# Patient Record
Sex: FEMALE | Race: WHITE | NOT HISPANIC OR LATINO | Employment: UNEMPLOYED | URBAN - NONMETROPOLITAN AREA
[De-identification: names, ages, dates, MRNs, and addresses within clinical notes are randomized per-mention and may not be internally consistent; named-entity substitution may affect disease eponyms.]

---

## 2017-05-14 ENCOUNTER — HOSPITAL ENCOUNTER (EMERGENCY)
Facility: HOSPITAL | Age: 37
Discharge: HOME | End: 2017-05-14
Attending: EMERGENCY MEDICINE | Admitting: EMERGENCY MEDICINE

## 2017-05-14 VITALS
RESPIRATION RATE: 16 BRPM | WEIGHT: 113.25 LBS | HEART RATE: 80 BPM | DIASTOLIC BLOOD PRESSURE: 72 MMHG | OXYGEN SATURATION: 100 % | HEIGHT: 64 IN | SYSTOLIC BLOOD PRESSURE: 107 MMHG | BODY MASS INDEX: 19.33 KG/M2 | TEMPERATURE: 98.5 F

## 2017-05-14 DIAGNOSIS — N39.0 ACUTE UTI: Primary | ICD-10-CM

## 2017-05-14 LAB
B-HCG UR QL: NEGATIVE
BACTERIA UR QL AUTO: ABNORMAL /HPF
BILIRUB UR QL STRIP: NEGATIVE
CLARITY UR: ABNORMAL
COLOR UR: ABNORMAL
GLUCOSE UR STRIP-MCNC: NEGATIVE MG/DL
HGB UR QL STRIP.AUTO: ABNORMAL
HYALINE CASTS UR QL AUTO: ABNORMAL /LPF
INTERNAL NEGATIVE CONTROL: NEGATIVE
INTERNAL POSITIVE CONTROL: POSITIVE
KETONES UR QL STRIP: NEGATIVE
LEUKOCYTE ESTERASE UR QL STRIP.AUTO: ABNORMAL
Lab: NORMAL
NITRITE UR QL STRIP: NEGATIVE
PH UR STRIP.AUTO: 6.5 [PH] (ref 5–8)
PROT UR QL STRIP: ABNORMAL
RBC # UR: ABNORMAL /HPF
REF LAB TEST METHOD: ABNORMAL
SP GR UR STRIP: 1.02 (ref 1–1.03)
SQUAMOUS #/AREA URNS HPF: ABNORMAL /HPF
UROBILINOGEN UR QL STRIP: ABNORMAL
WBC UR QL AUTO: ABNORMAL /HPF

## 2017-05-14 PROCEDURE — 87088 URINE BACTERIA CULTURE: CPT | Performed by: EMERGENCY MEDICINE

## 2017-05-14 PROCEDURE — 99283 EMERGENCY DEPT VISIT LOW MDM: CPT

## 2017-05-14 PROCEDURE — 87086 URINE CULTURE/COLONY COUNT: CPT | Performed by: EMERGENCY MEDICINE

## 2017-05-14 PROCEDURE — 81001 URINALYSIS AUTO W/SCOPE: CPT | Performed by: EMERGENCY MEDICINE

## 2017-05-14 PROCEDURE — 87186 SC STD MICRODIL/AGAR DIL: CPT | Performed by: EMERGENCY MEDICINE

## 2017-05-14 RX ORDER — KETOROLAC TROMETHAMINE 10 MG/1
10 TABLET, FILM COATED ORAL ONCE
Status: COMPLETED | OUTPATIENT
Start: 2017-05-14 | End: 2017-05-14

## 2017-05-14 RX ORDER — IBUPROFEN 800 MG/1
800 TABLET ORAL EVERY 8 HOURS PRN
Qty: 30 TABLET | Refills: 0 | Status: SHIPPED | OUTPATIENT
Start: 2017-05-14

## 2017-05-14 RX ORDER — SULFAMETHOXAZOLE AND TRIMETHOPRIM 800; 160 MG/1; MG/1
1 TABLET ORAL 2 TIMES DAILY
Qty: 14 TABLET | Refills: 0 | Status: SHIPPED | OUTPATIENT
Start: 2017-05-14

## 2017-05-14 RX ORDER — LEVOFLOXACIN 500 MG/1
500 TABLET, FILM COATED ORAL ONCE
Status: COMPLETED | OUTPATIENT
Start: 2017-05-14 | End: 2017-05-14

## 2017-05-14 RX ADMIN — LEVOFLOXACIN 500 MG: 500 TABLET, FILM COATED ORAL at 20:40

## 2017-05-14 RX ADMIN — KETOROLAC TROMETHAMINE 10 MG: 10 TABLET, FILM COATED ORAL at 20:40

## 2017-05-16 LAB — BACTERIA SPEC AEROBE CULT: ABNORMAL

## 2017-05-17 ENCOUNTER — TELEPHONE (OUTPATIENT)
Dept: EMERGENCY DEPT | Facility: HOSPITAL | Age: 37
End: 2017-05-17

## 2024-05-23 ENCOUNTER — APPOINTMENT (OUTPATIENT)
Dept: CT IMAGING | Facility: HOSPITAL | Age: 44
End: 2024-05-23
Payer: COMMERCIAL

## 2024-05-23 ENCOUNTER — HOSPITAL ENCOUNTER (EMERGENCY)
Facility: HOSPITAL | Age: 44
Discharge: HOME | End: 2024-05-23
Payer: COMMERCIAL

## 2024-05-23 VITALS
TEMPERATURE: 97.7 F | DIASTOLIC BLOOD PRESSURE: 68 MMHG | HEART RATE: 88 BPM | OXYGEN SATURATION: 99 % | WEIGHT: 110 LBS | RESPIRATION RATE: 15 BRPM | HEIGHT: 64 IN | BODY MASS INDEX: 18.78 KG/M2 | SYSTOLIC BLOOD PRESSURE: 110 MMHG

## 2024-05-23 DIAGNOSIS — M51.36 DDD (DEGENERATIVE DISC DISEASE), LUMBAR: ICD-10-CM

## 2024-05-23 DIAGNOSIS — S39.012A STRAIN OF LUMBAR REGION, INITIAL ENCOUNTER: Primary | ICD-10-CM

## 2024-05-23 LAB
B-HCG UR QL: NEGATIVE
BACTERIA UR QL AUTO: ABNORMAL /HPF
BILIRUB UR QL STRIP: NEGATIVE
CLARITY UR: CLEAR
COLOR UR: YELLOW
EXPIRATION DATE: NORMAL
GLUCOSE UR STRIP-MCNC: NEGATIVE MG/DL
HGB UR QL STRIP.AUTO: NEGATIVE
HYALINE CASTS UR QL AUTO: ABNORMAL /LPF
INTERNAL NEGATIVE CONTROL: NEGATIVE
INTERNAL POSITIVE CONTROL: POSITIVE
KETONES UR QL STRIP: NEGATIVE
LEUKOCYTE ESTERASE UR QL STRIP.AUTO: ABNORMAL
Lab: NORMAL
NITRITE UR QL STRIP: NEGATIVE
PH UR STRIP.AUTO: 7 [PH] (ref 5–8)
PROT UR QL STRIP: NEGATIVE
RBC # UR STRIP: ABNORMAL /HPF
REF LAB TEST METHOD: ABNORMAL
SP GR UR STRIP: 1.02 (ref 1–1.03)
SQUAMOUS #/AREA URNS HPF: ABNORMAL /HPF
UROBILINOGEN UR QL STRIP: ABNORMAL
WBC # UR STRIP: ABNORMAL /HPF

## 2024-05-23 PROCEDURE — 72131 CT LUMBAR SPINE W/O DYE: CPT

## 2024-05-23 PROCEDURE — 99284 EMERGENCY DEPT VISIT MOD MDM: CPT

## 2024-05-23 PROCEDURE — 63710000001 ONDANSETRON ODT 4 MG TABLET DISPERSIBLE: Performed by: NURSE PRACTITIONER

## 2024-05-23 PROCEDURE — 81001 URINALYSIS AUTO W/SCOPE: CPT | Performed by: NURSE PRACTITIONER

## 2024-05-23 PROCEDURE — 81025 URINE PREGNANCY TEST: CPT | Performed by: NURSE PRACTITIONER

## 2024-05-23 PROCEDURE — 63710000001 PREDNISONE PER 1 MG: Performed by: NURSE PRACTITIONER

## 2024-05-23 RX ORDER — ONDANSETRON 4 MG/1
4 TABLET, ORALLY DISINTEGRATING ORAL ONCE
Status: COMPLETED | OUTPATIENT
Start: 2024-05-23 | End: 2024-05-23

## 2024-05-23 RX ORDER — PREDNISONE 20 MG/1
20 TABLET ORAL ONCE
Status: COMPLETED | OUTPATIENT
Start: 2024-05-23 | End: 2024-05-23

## 2024-05-23 RX ORDER — HYDROCODONE BITARTRATE AND ACETAMINOPHEN 5; 325 MG/1; MG/1
1 TABLET ORAL EVERY 6 HOURS PRN
Qty: 15 TABLET | Refills: 0 | Status: SHIPPED | OUTPATIENT
Start: 2024-05-23

## 2024-05-23 RX ORDER — ONDANSETRON 4 MG/1
4 TABLET, ORALLY DISINTEGRATING ORAL EVERY 6 HOURS PRN
Qty: 10 TABLET | Refills: 0 | Status: SHIPPED | OUTPATIENT
Start: 2024-05-23 | End: 2024-05-30

## 2024-05-23 RX ORDER — CYCLOBENZAPRINE HCL 10 MG
10 TABLET ORAL 3 TIMES DAILY PRN
Qty: 20 TABLET | Refills: 0 | Status: SHIPPED | OUTPATIENT
Start: 2024-05-23 | End: 2024-05-30

## 2024-05-23 RX ORDER — CYCLOBENZAPRINE HCL 10 MG
10 TABLET ORAL ONCE
Status: COMPLETED | OUTPATIENT
Start: 2024-05-23 | End: 2024-05-23

## 2024-05-23 RX ORDER — METHYLPREDNISOLONE 4 MG/1
TABLET ORAL
Qty: 21 EACH | Refills: 0 | Status: SHIPPED | OUTPATIENT
Start: 2024-05-23 | End: 2024-05-30

## 2024-05-23 RX ORDER — ORPHENADRINE CITRATE 30 MG/ML
60 INJECTION INTRAMUSCULAR; INTRAVENOUS ONCE
Status: DISCONTINUED | OUTPATIENT
Start: 2024-05-23 | End: 2024-05-23

## 2024-05-23 RX ORDER — DEXAMETHASONE SODIUM PHOSPHATE 10 MG/ML
8 INJECTION INTRAMUSCULAR; INTRAVENOUS ONCE
Status: DISCONTINUED | OUTPATIENT
Start: 2024-05-23 | End: 2024-05-23

## 2024-05-23 RX ORDER — OXYCODONE AND ACETAMINOPHEN 7.5; 325 MG/1; MG/1
1 TABLET ORAL ONCE
Status: COMPLETED | OUTPATIENT
Start: 2024-05-23 | End: 2024-05-23

## 2024-05-23 RX ADMIN — OXYCODONE HYDROCHLORIDE AND ACETAMINOPHEN 1 TABLET: 7.5; 325 TABLET ORAL at 16:23

## 2024-05-23 RX ADMIN — ONDANSETRON 4 MG: 4 TABLET, ORALLY DISINTEGRATING ORAL at 16:30

## 2024-05-23 RX ADMIN — PREDNISONE 20 MG: 20 TABLET ORAL at 16:23

## 2024-05-23 RX ADMIN — CYCLOBENZAPRINE HYDROCHLORIDE 10 MG: 10 TABLET, FILM COATED ORAL at 16:23

## 2024-05-23 NOTE — DISCHARGE INSTRUCTIONS
Return to ER if symptoms worsen   [place] to area  Follow up with one of the [facility] physician groups below to setup primary care. If you have trouble making an appointment, please call the [facility] Nurse Line at [phone]    [facility] - [place]  [address] [place] [address]  [id number] [phone]    [facility] - [place] [address], [address], [place], [place] [id number] [phone]    [facility] - [place] [address], [address], [place] [address] [phone]     [facility] [place] - [place]  [address], [address], [place] [address] [phone]    [facility] - [address], [address] [phone]    [facility] - [address] [address] [phone]    [facility] - [place]  [address] [place], [place], [address] [phone]    [facility] - [place]  [address], [address]  [phone]    [facility] - [address] [address] [phone]    [facility] - [place]  [address] [place] [address], [address], [place], [place], [address] [phone]        PEDIATRIC:    [facility] - [place] [address], [address], [place] [address] [phone]

## 2024-05-30 ENCOUNTER — OFFICE VISIT (OUTPATIENT)
Dept: INTERNAL MEDICINE | Facility: CLINIC | Age: 44
End: 2024-05-30
Payer: COMMERCIAL

## 2024-05-30 VITALS
TEMPERATURE: 98.3 F | WEIGHT: 115 LBS | OXYGEN SATURATION: 98 % | HEIGHT: 64 IN | HEART RATE: 72 BPM | BODY MASS INDEX: 19.63 KG/M2 | DIASTOLIC BLOOD PRESSURE: 60 MMHG | SYSTOLIC BLOOD PRESSURE: 104 MMHG

## 2024-05-30 DIAGNOSIS — M47.816 SPONDYLOSIS OF LUMBAR REGION WITHOUT MYELOPATHY OR RADICULOPATHY: ICD-10-CM

## 2024-05-30 DIAGNOSIS — F41.9 ANXIETY AND DEPRESSION: ICD-10-CM

## 2024-05-30 DIAGNOSIS — F32.A ANXIETY AND DEPRESSION: ICD-10-CM

## 2024-05-30 DIAGNOSIS — S39.012A STRAIN OF LUMBAR REGION, INITIAL ENCOUNTER: Primary | ICD-10-CM

## 2024-05-30 PROCEDURE — 99204 OFFICE O/P NEW MOD 45 MIN: CPT | Performed by: INTERNAL MEDICINE

## 2024-05-30 NOTE — PROGRESS NOTES
"anxi      Chief Complaint  Back Pain (Pt seen in er on 5/23/24-low back pain-  pain is better), Establish Care, and Depression (Discuss medication )    Subjective        Nelia Levin presents to [facility]    HPI    Patient here for the above problems.  See Assessment and Plan for further HPI components.      Review of Systems    Objective   Vital Signs:  /60 (BP Location: Left arm, Patient Position: Sitting, Cuff Size: Adult)   Pulse 72   Temp 98.3 °F (36.8 °C) (Temporal)   Ht 162.6 cm (64\")   Wt 52.2 kg (115 lb)   SpO2 98%   BMI 19.74 kg/m²   Estimated body mass index is 19.74 kg/m² as calculated from the following:    Height as of this encounter: 162.6 cm (64\").    Weight as of this encounter: 52.2 kg (115 lb).      Physical Exam  Vitals and nursing note reviewed.   Constitutional:       Appearance: She is not ill-appearing.   Eyes:      General: No scleral icterus.     Conjunctiva/sclera: Conjunctivae normal.   Cardiovascular:      Rate and Rhythm: Normal rate and regular rhythm.   Pulmonary:      Effort: Pulmonary effort is normal. No respiratory distress.   Musculoskeletal:      Thoracic back: No spasms or tenderness. Normal range of motion.      Comments: Normal gait  Normal proximal and distal leg strength    Skin:     General: Skin is warm.      Coloration: Skin is not pale.   Neurological:      General: No focal deficit present.      Mental Status: She is alert and oriented to person, place, and time.   Psychiatric:         Mood and Affect: Mood normal.         Behavior: Behavior normal.                       Assessment and Plan   Diagnoses and all orders for this visit:    1. Strain of lumbar region, initial encounter (Primary)  -     Ambulatory Referral to Physical Therapy    2. Anxiety and depression    3. Spondylosis of lumbar region without myelopathy or radiculopathy    Other orders  -     sertraline (Zoloft) 50 MG tablet; Take 1 tablet by mouth Daily.  " Dispense: 90 tablet; Refill: 2      Patient presents today to establish care.  Patient has not had a primary care doctor in many years as overall healthy.  Recently had an ER visit on 5/23 with back pain.  The patient does not recall any injury or abnormal movements.  The patient received pain medication, muscle relaxant, and nausea medicine in the ER.  Patient feels better, but still not completely back to normal.  Consider physical therapy.  Ordered provided to patient.  She does not feel as though she needs any further medication at present.    Patient reports that she has had issues with depression and anxiety over the years.  This has been recurrent since she was young.  She has been on medications previously but does not recall what medication she was on.  Does not have SI/HI. Patient is interested in trying medication at present.  We discussed PRN such as buspirone or a long-term medication.  We ultimately decided to try zoloft.  Recommend 25 mg x 6 days and then go to the full dose of 50 mg.  Recommend we follow-up in 6-8 weeks to assess.      Patient declines labs for annual visit at next visit.  Patient has not had mammogram.  Patient has not had pap smear in many years.  We can have one of the APRNs perform this or [facility].  Previously followed with Dr. Hand.     Result Review :  The following data was reviewed by: Maximiliano Carmona MD on 05/30/2024:   Latest Reference Range & Units 05/23/24 16:10 05/23/24 16:12   Color, UA Yellow, Straw  Yellow    Appearance, UA Clear  Clear    Specific Gravity, UA 1.005 - 1.030  1.017    pH, UA 5.0 - 8.0  7.0    Glucose Negative  Negative    Ketones, UA Negative  Negative    Blood, UA Negative  Negative    Nitrite, UA Negative  Negative    Leukocytes, UA Negative  Trace !    Protein, UA Negative  Negative    Bilirubin, UA Negative  Negative    Urobilinogen, UA 0.2 - 1.0 E.U./dL  0.2 E.U./dL    RBC, UA None Seen, 0-2 /HPF 0-2    WBC, UA None Seen,  0-2 /HPF 3-5 !    Bacteria, UA None Seen /HPF 1+ !    Squamous Epithelial Cells, UA None Seen, 0-2 /HPF 3-6 !    Hyaline Casts, UA None Seen /LPF 0-2    Methodology:  Automated Microscopy    HCG, Urine QL Negative   Negative   Expiration Date   \01/28/2025\   Lot Number   \[id number]\   !: Data is abnormal    CT Lumbar Spine Without Contrast (05/23/2024 15:44)        BMI is within normal parameters. No other follow-up for BMI required.      BMI is within normal parameters. No other follow-up for BMI required.            Follow Up   Return in about 6 weeks (around 7/11/2024), or if symptoms worsen or fail to improve, for Med Check.  Patient was given instructions and counseling regarding her condition or for health maintenance advice. Please see specific information pulled into the AVS if appropriate.       MENDY Carmona MD, FACP, FHM      Electronically signed by Maximiliano Carmona MD, 05/30/24, 4:05 PM CDT.

## 2024-06-03 PROBLEM — S39.012A STRAIN OF LUMBAR REGION: Status: ACTIVE | Noted: 2024-06-03

## 2024-06-03 PROBLEM — M47.816 SPONDYLOSIS OF LUMBAR REGION WITHOUT MYELOPATHY OR RADICULOPATHY: Status: ACTIVE | Noted: 2024-06-03

## 2024-06-03 PROBLEM — F41.9 ANXIETY AND DEPRESSION: Status: ACTIVE | Noted: 2024-06-03

## 2024-06-03 PROBLEM — F32.A ANXIETY AND DEPRESSION: Status: ACTIVE | Noted: 2024-06-03

## 2024-06-10 ENCOUNTER — TELEPHONE (OUTPATIENT)
Dept: INTERNAL MEDICINE | Facility: CLINIC | Age: 44
End: 2024-06-10

## 2024-06-10 RX ORDER — VENLAFAXINE HYDROCHLORIDE 37.5 MG/1
37.5 CAPSULE, EXTENDED RELEASE ORAL DAILY
Qty: 30 CAPSULE | Refills: 2 | Status: SHIPPED | OUTPATIENT
Start: 2024-06-10 | End: 2024-07-11 | Stop reason: SDUPTHER

## 2024-06-10 NOTE — TELEPHONE ENCOUNTER
Caller: Nelia Levin    Relationship: Self    Best call back number:  [phone]     What is the best time to reach you:ANY    Who are you requesting to speak with (clinical staff, provider,  specific staff member): CLINICAL         What was the call regarding: PLEASE CALL PATIENT BACK, SHE HAS STARTED THE ZOLOFT FOR THE PAST COUPLE OF WEEKS, HOWEVER,  SHE SAYS HER DEPRESSION IS WORSE.  SADNESS, DIDN'T GET OFF THE COUCH.

## 2024-06-10 NOTE — TELEPHONE ENCOUNTER
Patient's depression has been a little bit worse and starting Zoloft.  Patient reports that she has not want to get off the couch.  During her visit, she did not recall what she had tried previously.  We will stop the Zoloft since she had adverse effects related to it.  We will try Effexor, we will start at the lowest dose 37.5 mg.  This has been sent to her pharmacy.  Patient also offered referral to psychiatry or therapy.  She declined this at the present time and would like to just try new medication.  I think this is reasonable.

## 2024-07-11 ENCOUNTER — OFFICE VISIT (OUTPATIENT)
Dept: INTERNAL MEDICINE | Facility: CLINIC | Age: 44
End: 2024-07-11
Payer: COMMERCIAL

## 2024-07-11 VITALS
TEMPERATURE: 98.6 F | BODY MASS INDEX: 18.95 KG/M2 | HEART RATE: 65 BPM | OXYGEN SATURATION: 99 % | SYSTOLIC BLOOD PRESSURE: 98 MMHG | WEIGHT: 111 LBS | HEIGHT: 64 IN | DIASTOLIC BLOOD PRESSURE: 60 MMHG

## 2024-07-11 DIAGNOSIS — S39.012A STRAIN OF LUMBAR REGION, INITIAL ENCOUNTER: Primary | ICD-10-CM

## 2024-07-11 DIAGNOSIS — F41.9 ANXIETY AND DEPRESSION: ICD-10-CM

## 2024-07-11 DIAGNOSIS — F32.A ANXIETY AND DEPRESSION: ICD-10-CM

## 2024-07-11 PROCEDURE — 99214 OFFICE O/P EST MOD 30 MIN: CPT | Performed by: INTERNAL MEDICINE

## 2024-07-11 RX ORDER — VENLAFAXINE HYDROCHLORIDE 37.5 MG/1
37.5 CAPSULE, EXTENDED RELEASE ORAL DAILY
Qty: 90 CAPSULE | Refills: 2 | Status: SHIPPED | OUTPATIENT
Start: 2024-07-11

## 2024-07-11 NOTE — PROGRESS NOTES
"      Chief Complaint  Anxiety (6 week follow up/Now on Effexor for a month is working better than zoloft ) and Depression    Subjective        Nelia Levin presents to [facility]    HPI    Patient here for the above problems.  See Assessment and Plan for further HPI components.      Review of Systems   Neurological:  Negative for dizziness.   Psychiatric/Behavioral:  Negative for confusion.        Objective   Vital Signs:  BP 98/60 (BP Location: Left arm, Patient Position: Sitting, Cuff Size: Adult)   Pulse 65   Temp 98.6 °F (37 °C) (Temporal)   Ht 162.6 cm (64\")   Wt 50.3 kg (111 lb)   SpO2 99%   BMI 19.05 kg/m²   Estimated body mass index is 19.05 kg/m² as calculated from the following:    Height as of this encounter: 162.6 cm (64\").    Weight as of this encounter: 50.3 kg (111 lb).      Physical Exam  Vitals and nursing note reviewed.   Constitutional:       Appearance: She is not ill-appearing.   Eyes:      General: No scleral icterus.     Conjunctiva/sclera: Conjunctivae normal.   Pulmonary:      Effort: Pulmonary effort is normal. No respiratory distress.   Skin:     General: Skin is warm.      Coloration: Skin is not pale.   Neurological:      General: No focal deficit present.      Mental Status: She is alert and oriented to person, place, and time.   Psychiatric:         Mood and Affect: Mood normal.         Behavior: Behavior normal.          Answers submitted by the patient for this visit:  Primary Reason for Visit (Submitted on 7/7/2024)  What is the primary reason for your visit?: Depression  Depression (Submitted on 7/7/2024)  Chief Complaint: Depression  Visit: follow-up  Frequency: most days  Severity: moderate  excessive worry: No  insomnia: Yes  irritability: No  malaise/fatigue: Yes  obsessions: No  hypersomnia: No  difficulty controlling mood: No  difficulty staying asleep: Yes  difficulty falling asleep: Yes  Medication compliance: %           "   Assessment and Plan   Diagnoses and all orders for this visit:    1. Strain of lumbar region, initial encounter (Primary)    2. Anxiety and depression    Other orders  -     venlafaxine XR (Effexor XR) 37.5 MG 24 hr capsule; Take 1 capsule by mouth Daily.  Dispense: 90 capsule; Refill: 2      Anxiety depression is a little bit better.  PHQ-9 and JESUS-7 were performed.  Over the last two weeks, how often have you been bothered by the following problems?  Feeling nervous, anxious or on edge: Several days  Not being able to stop or control worrying: Several days  Worrying too much about different things: Several days  Trouble Relaxing: Not at all  Being so restless that it is hard to sit still: Not at all  Becoming easily annoyed or irritable: Several days  Feeling afraid as if something awful might happen: Not at all  JESUS 7 Total Score: 4  If you checked any problems, how difficult have these problems made it for you to do your work, take care of things at home, or get along with other people: Somewhat difficult  PHQ-9 Depression Screening  Little interest or pleasure in doing things? 3-->nearly every day   Feeling down, depressed, or hopeless? 0-->not at all   Trouble falling or staying asleep, or sleeping too much? 3-->nearly every day   Feeling tired or having little energy? 3-->nearly every day   Poor appetite or overeating? 3-->nearly every day   Feeling bad about yourself - or that you are a failure or have let yourself or your family down? 0-->not at all   Trouble concentrating on things, such as reading the newspaper or watching television? 0-->not at all   Moving or speaking so slowly that other people could have noticed? Or the opposite - being so fidgety or restless that you have been moving around a lot more than usual? 0-->not at all   Thoughts that you would be better off dead, or of hurting yourself in some way? 0-->not at all   PHQ-9 Total Score 12   If you checked off any problems, how difficult have  these problems made it for you to do your work, take care of things at home, or get along with other people? somewhat difficult     Patient did not tolerate Zoloft.  Patient has been tolerating the Effexor without any significant issues.  She is only 4 weeks into taking the Effexor, so she may still continue to have increased benefit.  We discussed that I would recommend that we continue this medication for at least 6 months, if not a year given that this is a recurrent issue.  There is also nothing wrong with staying on this medication for long-term.  Patient indicates that she is having normal ups and downs.    Patient states that she had a random episode of chest pain.  Not associate with food.  Not associated with exertion.  If she has any more of these episodes, hopefully we can find something that ties to them to further investigate.  Does not sound like angina.  Very atypical for cardiac related.    Patient never got a call from physical therapy.  Going to investigate this further.  Patient is no longer having back pain.  She is doing well.    Result Review :           BMI is within normal parameters. No other follow-up for BMI required.      BMI is within normal parameters. No other follow-up for BMI required.     Two or more chronic problems  Medication management.       Follow Up   Return in about 3 months (around 10/11/2024), or if symptoms worsen or fail to improve, for follow up for above problems. Longitudinal care..  Patient was given instructions and counseling regarding her condition or for health maintenance advice. Please see specific information pulled into the AVS if appropriate.       MENDY Carmona MD, FACP, [facility]      Electronically signed by Maximiliano Carmona MD, 07/11/24, 4:35 PM CDT.

## 2024-09-16 DIAGNOSIS — M47.816 SPONDYLOSIS OF LUMBAR REGION WITHOUT MYELOPATHY OR RADICULOPATHY: Primary | ICD-10-CM

## 2024-09-16 RX ORDER — METHYLPREDNISOLONE 4 MG
TABLET, DOSE PACK ORAL
Qty: 21 TABLET | Refills: 0 | Status: SHIPPED | OUTPATIENT
Start: 2024-09-16

## 2024-09-23 ENCOUNTER — TELEPHONE (OUTPATIENT)
Dept: INTERNAL MEDICINE | Facility: CLINIC | Age: 44
End: 2024-09-23

## 2024-09-23 NOTE — TELEPHONE ENCOUNTER
Caller: Nelia Levin    Relationship: Self    Best call back number: [phone]     What is the best time to reach you: ANY    Who are you requesting to speak with (clinical staff, provider,  specific staff member): NONE SPECIFIED     What was the call regarding:     PATIENT WOULD LIKE TO HAVE HER MRI PERFORMED EARLIER THAN 10.16.24. PATIENT STATES THAT SCHEDULING CAN SEE HER EARLIER, HOWEVER THIS MRI WILL REQUIRE AN AUTHORIZATION.    PLEASE FOLLOW-UP WITH PATIENT REGARDING THIS REQUEST.     Is it okay if the provider responds through upadhart: YES

## 2024-10-16 ENCOUNTER — HOSPITAL ENCOUNTER (OUTPATIENT)
Dept: MRI IMAGING | Facility: HOSPITAL | Age: 44
Discharge: HOME | End: 2024-10-16
Admitting: INTERNAL MEDICINE
Payer: COMMERCIAL

## 2024-10-16 DIAGNOSIS — M51.369 BULGING LUMBAR DISC: Primary | ICD-10-CM

## 2024-10-16 DIAGNOSIS — M47.816 SPONDYLOSIS OF LUMBAR REGION WITHOUT MYELOPATHY OR RADICULOPATHY: ICD-10-CM

## 2024-10-16 PROCEDURE — 72148 MRI LUMBAR SPINE W/O DYE: CPT

## 2024-10-22 ENCOUNTER — OFFICE VISIT (OUTPATIENT)
Dept: INTERNAL MEDICINE | Facility: CLINIC | Age: 44
End: 2024-10-22
Payer: COMMERCIAL

## 2024-10-22 VITALS
BODY MASS INDEX: 19.46 KG/M2 | DIASTOLIC BLOOD PRESSURE: 56 MMHG | HEART RATE: 74 BPM | SYSTOLIC BLOOD PRESSURE: 94 MMHG | OXYGEN SATURATION: 98 % | HEIGHT: 64 IN | TEMPERATURE: 98.6 F | WEIGHT: 114 LBS

## 2024-10-22 DIAGNOSIS — M51.369 BULGING LUMBAR DISC: ICD-10-CM

## 2024-10-22 DIAGNOSIS — M47.816 SPONDYLOSIS OF LUMBAR REGION WITHOUT MYELOPATHY OR RADICULOPATHY: ICD-10-CM

## 2024-10-22 DIAGNOSIS — Z00.00 WELLNESS EXAMINATION: Primary | ICD-10-CM

## 2024-10-22 DIAGNOSIS — F32.A ANXIETY AND DEPRESSION: ICD-10-CM

## 2024-10-22 DIAGNOSIS — F41.9 ANXIETY AND DEPRESSION: ICD-10-CM

## 2024-10-22 DIAGNOSIS — Z11.59 NEED FOR HEPATITIS C SCREENING TEST: ICD-10-CM

## 2024-10-22 PROCEDURE — 99214 OFFICE O/P EST MOD 30 MIN: CPT | Performed by: INTERNAL MEDICINE

## 2024-10-22 NOTE — PROGRESS NOTES
"      Chief Complaint  Anxiety (3 month follow up)    Subjective        Nelia Levin presents to [facility]    HPI    Patient here for the above problems.  See Assessment and Plan for further HPI components.      Review of Systems   Neurological:  Negative for dizziness.   Psychiatric/Behavioral:  Negative for confusion.        Objective   Vital Signs:  BP 94/56 (BP Location: Left arm, Patient Position: Sitting, Cuff Size: Adult)   Pulse 74   Temp 98.6 °F (37 °C) (Temporal)   Ht 162.6 cm (64.02\")   Wt 51.7 kg (114 lb)   SpO2 98%   BMI 19.56 kg/m²   Estimated body mass index is 19.56 kg/m² as calculated from the following:    Height as of this encounter: 162.6 cm (64.02\").    Weight as of this encounter: 51.7 kg (114 lb).      Physical Exam  Vitals and nursing note reviewed.   Constitutional:       Appearance: She is not ill-appearing.   Eyes:      General: No scleral icterus.     Conjunctiva/sclera: Conjunctivae normal.   Pulmonary:      Effort: Pulmonary effort is normal. No respiratory distress.   Skin:     General: Skin is warm.      Coloration: Skin is not pale.   Neurological:      General: No focal deficit present.      Mental Status: She is alert and oriented to person, place, and time.   Psychiatric:         Mood and Affect: Mood normal.         Behavior: Behavior normal.                     Assessment and Plan   Diagnoses and all orders for this visit:    1. Wellness examination (Primary)  -     CBC & Differential; Future  -     Comprehensive Metabolic Panel; Future  -     Urinalysis With Microscopic - Urine, Clean Catch; Future  -     Lipid Panel; Future  -     TSH Rfx On Abnormal To Free T4; Future    2. Need for hepatitis C screening test  -     Hepatitis C Antibody; Future    3. Bulging lumbar disc    4. Spondylosis of lumbar region without myelopathy or radiculopathy    5. Anxiety and depression    Today is not her wellness examination, labs ordered to be completed " prior to her next visit.       History of Present Illness  The patient is a 44-year-old female who presents today for follow-up.    She reports an improvement in her anxiety and depression since starting Effexor 37.5 mg daily. This medication is well-tolerated without significant side effects.    Regarding her back, she reports no further issues. In September 2023, she contacted the clinic due to worsening pain and had been taking Tylenol and ibuprofen. An MRI was ordered at that time.      Assessment & Plan        Patient has bulging disc and had an episode of back pain that resolved shortly after starting.  I would like her to go ahead and see the neurosurgeon to be established especailly due to the disc bulge putting mass effect on L5 nerve root and thecal sac stenosis.  If asymptomatic right now, may not need any intervention obviously but previously had fairly significant pain.        Consider PT if any exacerbation of back pain     Anxiety and depression better.  Continue effexor.      Labs as above     Follow-up  She will have her wellness exam on 01/29/2025. At that point, she can switch to every 6 months or annual visits.    MRI Lumbar Spine Without Contrast (10/16/2024 14:05)         Result Review :           BMI is within normal parameters. No other follow-up for BMI required.      BMI is within normal parameters. No other follow-up for BMI required.            Follow Up   Return in about 3 months (around 1/22/2025), or if symptoms worsen or fail to improve, for follow up for above problems. Longitudinal care..  Patient was given instructions and counseling regarding her condition or for health maintenance advice. Please see specific information pulled into the AVS if appropriate.       MENDY Carmona MD, FACP, [facility]      Electronically signed by Maximiliano Carmona MD, 10/25/24, 12:48 PM CDT.    Patient or patient representative verbalized consent for the use of Ambient Listening during the visit with   Maximiliano Carmona MD for chart documentation. 10/25/2024  12:52 CDT

## 2024-11-12 ENCOUNTER — OFFICE VISIT (OUTPATIENT)
Dept: NEUROSURGERY | Facility: CLINIC | Age: 44
End: 2024-11-12
Payer: COMMERCIAL

## 2024-11-12 ENCOUNTER — HOSPITAL ENCOUNTER (OUTPATIENT)
Dept: GENERAL RADIOLOGY | Facility: HOSPITAL | Age: 44
Discharge: HOME | End: 2024-11-12
Admitting: NURSE PRACTITIONER
Payer: COMMERCIAL

## 2024-11-12 VITALS — HEIGHT: 64 IN | WEIGHT: 116.8 LBS | BODY MASS INDEX: 19.94 KG/M2

## 2024-11-12 DIAGNOSIS — M54.59 MECHANICAL LOW BACK PAIN: Primary | ICD-10-CM

## 2024-11-12 DIAGNOSIS — M54.59 MECHANICAL LOW BACK PAIN: ICD-10-CM

## 2024-11-12 PROCEDURE — 72120 X-RAY BEND ONLY L-S SPINE: CPT

## 2024-11-12 PROCEDURE — 99204 OFFICE O/P NEW MOD 45 MIN: CPT | Performed by: NURSE PRACTITIONER

## 2024-11-12 RX ORDER — CYCLOBENZAPRINE HCL 10 MG
10 TABLET ORAL NIGHTLY
Qty: 30 TABLET | Refills: 0 | Status: SHIPPED | OUTPATIENT
Start: 2024-11-12

## 2024-11-12 RX ORDER — MELOXICAM 15 MG/1
15 TABLET ORAL DAILY
Qty: 30 TABLET | Refills: 0 | Status: SHIPPED | OUTPATIENT
Start: 2024-11-12

## 2024-11-12 NOTE — PROGRESS NOTES
"Primary Care Provider: Maximiliano Carmona MD    Chief Complaint:   Chief Complaint   Patient presents with    Back Pain     PT is here for complaints of burning sensation in legs.        History of Present Illness    HPI:  Nelia Levin is a 44 y.o. female who presents today with a complaint of lumbar back pain.  No injury.    Onset of her lumbar back pain began in May, while lifting her leg to don her pants.  Her discomfort lasted 2-3 days before improving.    Since her initial onset, her back discomfort has been intermittent.  She additionally endorsed \"burning\" pain to the bilateral anterior thighs that has since resolved.  She denies lower extremity weakness, numbness, or tingling.  Her discomfort in general worsened with prolonged sitting and with physical activity that requires lifting or twisting.  Minimal alleviation of her discomfort with lying down.  Ms. Levin denies fevers, chills, night sweats, unexplained weight loss, saddle anesthesia, or bowel or bladder dysfunction.  In general, her symptoms have improved by 90-95% since onset.  She currently rates the severity of her symptoms 3/10.      Ms. Levin has not completed a dedicated course of physician directed physical therapy, massage care, chiropractic care, nor been evaluated by pain management.     Oswestry Disability Index = 10%   Score   Pain Intensity No pain-0   Personal Care Look after myself without pain-0   Lifting Only very light weights-4   Walking Walk any distance-0   Sitting Sit in \"favorite\" chair as long as I like-1   Standing Stand as long as I like-0   Sleeping Sleep is not disturbed-0   Sex Life (if applicable) My sex life is normal-1   Social Life Social life is normal-0   Traveling Travel without pain-0   (Prichard et al, 1980)    SCORE INTERPRETATION OF THE OSWESTRY LBP DISABILITY QUESTIONNAIRE     0-20% Minimal disability.  Can cope with most ADLs. Usually no treatment is needed, apart from advice on lifting, sitting, posture, " "physical fitness, and diet.  In this group, some patients have particular difficulty with sitting and this may be important if their occupation is sedentary (, , etc.).    ROS  Review of Systems   Constitutional: Negative.    HENT: Negative.     Eyes: Negative.    Respiratory: Negative.     Cardiovascular: Negative.    Gastrointestinal: Negative.    Endocrine: Negative.    Genitourinary:  Positive for urgency.   Musculoskeletal:  Positive for back pain.   Skin: Negative.    Allergic/Immunologic: Negative.    Neurological: Negative.    Hematological:  Bruises/bleeds easily.   Psychiatric/Behavioral:  Positive for agitation, decreased concentration, dysphoric mood and sleep disturbance.    All other systems reviewed and are negative.    Past Medical History:   Diagnosis Date    Depression 1999    Low back pain      Past Surgical History:   Procedure Laterality Date    TONSILLECTOMY       Family History: family history includes Cancer in her paternal grandmother; Diabetes in her father; Heart disease in her father and paternal grandfather; Hypertension in her father.    Social History:  reports that she has never smoked. She has never been exposed to tobacco smoke. She has never used smokeless tobacco. She reports that she does not drink alcohol and does not use drugs.    Medications:    Current Outpatient Medications:     cyclobenzaprine (FLEXERIL) 10 MG tablet, Take 1 tablet by mouth Every Night., Disp: 30 tablet, Rfl: 0    meloxicam (Mobic) 15 MG tablet, Take 1 tablet by mouth Daily., Disp: 30 tablet, Rfl: 0    venlafaxine XR (Effexor XR) 37.5 MG 24 hr capsule, Take 1 capsule by mouth Daily., Disp: 90 capsule, Rfl: 2    Allergies:  Patient has no known allergies.    Objective   Ht 162.6 cm (64.02\")   Wt 53 kg (116 lb 12.8 oz)   LMP 10/17/2024   BMI 20.04 kg/m²   Physical Exam  Vitals and nursing note reviewed.   Constitutional:       General: She is not in acute distress.     Appearance: Normal " appearance. She is well-developed, well-groomed and normal weight. She is not ill-appearing, toxic-appearing or diaphoretic.   HENT:      Head: Normocephalic and atraumatic.      Right Ear: Hearing normal.      Left Ear: Hearing normal.   Eyes:      General: Lids are normal.      Extraocular Movements: Extraocular movements intact.      Conjunctiva/sclera: Conjunctivae normal.      Pupils: Pupils are equal, round, and reactive to light.   Neck:      Trachea: Trachea normal.   Cardiovascular:      Rate and Rhythm: Normal rate and regular rhythm.   Pulmonary:      Effort: Pulmonary effort is normal. No tachypnea, bradypnea, accessory muscle usage or respiratory distress.   Abdominal:      Palpations: Abdomen is soft.   Musculoskeletal:      Cervical back: Full passive range of motion without pain and neck supple.   Skin:     General: Skin is warm and dry.   Neurological:      GCS: GCS eye subscore is 4. GCS verbal subscore is 5. GCS motor subscore is 6.      Coordination: Coordination is intact.      Deep Tendon Reflexes:      Reflex Scores:       Tricep reflexes are 2+ on the right side and 2+ on the left side.       Bicep reflexes are 2+ on the right side and 2+ on the left side.       Brachioradialis reflexes are 2+ on the right side and 2+ on the left side.       Patellar reflexes are 2+ on the right side and 2+ on the left side.       Achilles reflexes are 2+ on the right side and 2+ on the left side.  Psychiatric:         Speech: Speech normal.         Behavior: Behavior normal. Behavior is cooperative.       Neurological Exam  Mental Status  Awake, alert and oriented to person, place and time. Speech is normal. Language is fluent with no aphasia. Attention and concentration are normal.    Cranial Nerves  CN I: Sense of smell is normal.  CN II: Visual acuity is normal.  CN III, IV, VI: Extraocular movements intact bilaterally. Normal lids and orbits bilaterally. Pupils equal round and reactive to light  bilaterally.  CN V: Facial sensation is normal.  CN VII: Full and symmetric facial movement.  CN IX, X: Palate elevates symmetrically  CN XI: Shoulder shrug strength is normal.  CN XII: Tongue midline without atrophy or fasciculations.    Motor  Normal muscle bulk throughout. Normal muscle tone.                                               Right                     Left  Toe extension                        5                          5                                             Right                     Left  Deltoid                                   5                          5   Biceps                                   5                          5   Triceps                                  5                          5   Wrist extensor                       5                          5   Iliopsoas                               5                          5   Quadriceps                           5                          5   Gastrocnemius                     5                           5   Anterior tibialis                      5                          5    Sensory  Sensation is intact to light touch, pinprick, vibration and proprioception in all four extremities.    Reflexes                                            Right                      Left  Brachioradialis                    2+                         2+  Biceps                                 2+                         2+  Triceps                                2+                         2+  Patellar                                2+                         2+  Achilles                                2+                         2+  Right Plantar: downgoing  Left Plantar: downgoing    Right pathological reflexes: Salvador's absent. Ankle clonus absent.  Left pathological reflexes: Salvador's absent. Ankle clonus absent.    Coordination    Finger-to-nose, rapid alternating movements and heel-to-shin normal bilaterally without dysmetria.    Gait  Casual  gait is normal including stance, stride, and arm swing.    Imaging: (independent review and interpretation)  5/23/2024.  CT of the lumbar spine shows no evidence of acute fractures, thoracic scoliosis, mild to moderate multilevel degenerative changes.      10/16/2024.  MRI of the lumbar spine shows no bone marrow signal changes suggest acute fractures, small Schmorl nodes to the inferior endplate of L4, no malalignment, multilevel facet arthropathy and ligamentous flavum hypertrophy, left paracentral disc protrusion at L4-5 resulting in mild to moderate thecal sac compression and left greater than right foraminal narrowing.      ASSESSMENT and PLAN  Nelia Levin is a 44 y.o. female with significant medical comorbidities to include depression.  She presents with a new problem of intermittent flareups of mechanical low back pain that is improved by 90-95% since onset. SONIA: 10.  Physical exam findings of neurologically intact.  Her imaging shows left paracentral disc protrusion at L4-5 resulting in mild thecal sac compression and left greater than right foraminal narrowing.    RECOMMENDATIONS ...  Intermittent flareups of mechanical low back pain  For further evaluation we will proceed today by obtaining 2-3 view lateral x-rays of the lumbar spine with flexion-extension to assess for instability to assess for areas of instability.  As a means of first-line conservative management for lumbar back pain, we will send her for a dedicated course of physician directed physical therapy; Rx provided.    Nelia denies a history of renal insufficiency or contraindications for NSAIDs, therefore we will provide him with a trial prescription(s) for Mobic and Flexeril.  Benefits, risk, adverse effects, and use discussed. Once all testing is complete we will have her follow-up with Dr. Parekh for reassessment and to discuss the need for surgical intervention.  I advised the patient to call to return sooner for new or worsening  complaints of weakness, paresthesias, gait disturbances, or any additional concerns.  Treatment options discussed in detail with Nelia and they voiced understanding and agree with this plan of care.    Diagnoses and all orders for this visit:    1. Mechanical low back pain (Primary)  -     XR Spine Lumbar Flex & Ext; Future  -     Ambulatory Referral to Physical Therapy for Evaluation & Treatment  -     meloxicam (Mobic) 15 MG tablet; Take 1 tablet by mouth Daily.  Dispense: 30 tablet; Refill: 0  -     cyclobenzaprine (FLEXERIL) 10 MG tablet; Take 1 tablet by mouth Every Night.  Dispense: 30 tablet; Refill: 0      Return for WILL CALL FOR FOLLOW UP APPOINTMENT.    Thank you for this Consultation and the opportunity to participate in Nelia's care.    Sincerely,    CHIARA Wiseman

## 2025-01-24 ENCOUNTER — LAB (OUTPATIENT)
Dept: INTERNAL MEDICINE | Facility: CLINIC | Age: 45
End: 2025-01-24
Payer: COMMERCIAL

## 2025-01-24 DIAGNOSIS — Z11.59 NEED FOR HEPATITIS C SCREENING TEST: ICD-10-CM

## 2025-01-24 DIAGNOSIS — Z00.00 WELLNESS EXAMINATION: ICD-10-CM

## 2025-01-25 LAB
ALBUMIN SERPL-MCNC: 4.3 G/DL (ref 3.5–5.2)
ALBUMIN/GLOB SERPL: 1.3 G/DL
ALP SERPL-CCNC: 69 U/L (ref 39–117)
ALT SERPL-CCNC: 8 U/L (ref 1–33)
APPEARANCE UR: ABNORMAL
AST SERPL-CCNC: 8 U/L (ref 1–32)
BACTERIA #/AREA URNS HPF: ABNORMAL /HPF
BASOPHILS # BLD AUTO: 0.03 10*3/MM3 (ref 0–0.2)
BASOPHILS NFR BLD AUTO: 0.4 % (ref 0–1.5)
BILIRUB SERPL-MCNC: 0.8 MG/DL (ref 0–1.2)
BILIRUB UR QL STRIP: NEGATIVE
BUN SERPL-MCNC: 11 MG/DL (ref 6–20)
BUN/CREAT SERPL: 15.7 (ref 7–25)
CALCIUM SERPL-MCNC: 9.8 MG/DL (ref 8.6–10.5)
CASTS URNS MICRO: ABNORMAL
CHLORIDE SERPL-SCNC: 99 MMOL/L (ref 98–107)
CHOLEST SERPL-MCNC: 161 MG/DL (ref 0–200)
CO2 SERPL-SCNC: 26.2 MMOL/L (ref 22–29)
COLOR UR: YELLOW
CREAT SERPL-MCNC: 0.7 MG/DL (ref 0.57–1)
EGFRCR SERPLBLD CKD-EPI 2021: 110.2 ML/MIN/1.73
EOSINOPHIL # BLD AUTO: 0.02 10*3/MM3 (ref 0–0.4)
EOSINOPHIL NFR BLD AUTO: 0.3 % (ref 0.3–6.2)
EPI CELLS #/AREA URNS HPF: ABNORMAL /HPF
ERYTHROCYTE [DISTWIDTH] IN BLOOD BY AUTOMATED COUNT: 11.8 % (ref 12.3–15.4)
GLOBULIN SER CALC-MCNC: 3.4 GM/DL
GLUCOSE SERPL-MCNC: 80 MG/DL (ref 65–99)
GLUCOSE UR QL STRIP: NEGATIVE
HCT VFR BLD AUTO: 35.1 % (ref 34–46.6)
HCV IGG SERPL QL IA: NON REACTIVE
HDLC SERPL-MCNC: 60 MG/DL (ref 40–60)
HGB BLD-MCNC: 11.4 G/DL (ref 12–15.9)
HGB UR QL STRIP: NEGATIVE
IMM GRANULOCYTES # BLD AUTO: 0.01 10*3/MM3 (ref 0–0.05)
IMM GRANULOCYTES NFR BLD AUTO: 0.1 % (ref 0–0.5)
KETONES UR QL STRIP: NEGATIVE
LDLC SERPL CALC-MCNC: 90 MG/DL (ref 0–100)
LEUKOCYTE ESTERASE UR QL STRIP: ABNORMAL
LYMPHOCYTES # BLD AUTO: 2.05 10*3/MM3 (ref 0.7–3.1)
LYMPHOCYTES NFR BLD AUTO: 30.7 % (ref 19.6–45.3)
MCH RBC QN AUTO: 29.3 PG (ref 26.6–33)
MCHC RBC AUTO-ENTMCNC: 32.5 G/DL (ref 31.5–35.7)
MCV RBC AUTO: 90.2 FL (ref 79–97)
MONOCYTES # BLD AUTO: 0.54 10*3/MM3 (ref 0.1–0.9)
MONOCYTES NFR BLD AUTO: 8.1 % (ref 5–12)
NEUTROPHILS # BLD AUTO: 4.03 10*3/MM3 (ref 1.7–7)
NEUTROPHILS NFR BLD AUTO: 60.4 % (ref 42.7–76)
NITRITE UR QL STRIP: NEGATIVE
NRBC BLD AUTO-RTO: 0 /100 WBC (ref 0–0.2)
PH UR STRIP: 6.5 [PH] (ref 5–8)
PLATELET # BLD AUTO: 247 10*3/MM3 (ref 140–450)
POTASSIUM SERPL-SCNC: 3.9 MMOL/L (ref 3.5–5.2)
PROT SERPL-MCNC: 7.7 G/DL (ref 6–8.5)
PROT UR QL STRIP: ABNORMAL
RBC # BLD AUTO: 3.89 10*6/MM3 (ref 3.77–5.28)
RBC #/AREA URNS HPF: ABNORMAL /HPF
SODIUM SERPL-SCNC: 136 MMOL/L (ref 136–145)
SP GR UR STRIP: 1.03 (ref 1–1.03)
TRIGL SERPL-MCNC: 51 MG/DL (ref 0–150)
TSH SERPL DL<=0.005 MIU/L-ACNC: 1.38 UIU/ML (ref 0.27–4.2)
UROBILINOGEN UR STRIP-MCNC: ABNORMAL MG/DL
VLDLC SERPL CALC-MCNC: 11 MG/DL (ref 5–40)
WBC # BLD AUTO: 6.68 10*3/MM3 (ref 3.4–10.8)
WBC #/AREA URNS HPF: ABNORMAL /HPF

## 2025-02-03 ENCOUNTER — OFFICE VISIT (OUTPATIENT)
Dept: INTERNAL MEDICINE | Facility: CLINIC | Age: 45
End: 2025-02-03
Payer: COMMERCIAL

## 2025-02-03 VITALS
TEMPERATURE: 98.4 F | OXYGEN SATURATION: 98 % | WEIGHT: 114 LBS | HEIGHT: 64 IN | HEART RATE: 104 BPM | SYSTOLIC BLOOD PRESSURE: 98 MMHG | DIASTOLIC BLOOD PRESSURE: 56 MMHG | BODY MASS INDEX: 19.46 KG/M2

## 2025-02-03 DIAGNOSIS — S39.012D STRAIN OF LUMBAR REGION, SUBSEQUENT ENCOUNTER: ICD-10-CM

## 2025-02-03 DIAGNOSIS — Z12.31 SCREENING MAMMOGRAM, ENCOUNTER FOR: ICD-10-CM

## 2025-02-03 DIAGNOSIS — Z00.00 WELLNESS EXAMINATION: Primary | ICD-10-CM

## 2025-02-03 DIAGNOSIS — F32.A ANXIETY AND DEPRESSION: ICD-10-CM

## 2025-02-03 DIAGNOSIS — M47.816 SPONDYLOSIS OF LUMBAR REGION WITHOUT MYELOPATHY OR RADICULOPATHY: ICD-10-CM

## 2025-02-03 DIAGNOSIS — D64.9 ANEMIA, UNSPECIFIED TYPE: ICD-10-CM

## 2025-02-03 DIAGNOSIS — F41.9 ANXIETY AND DEPRESSION: ICD-10-CM

## 2025-02-03 PROCEDURE — 99396 PREV VISIT EST AGE 40-64: CPT | Performed by: INTERNAL MEDICINE

## 2025-02-03 NOTE — PROGRESS NOTES
"      Chief Complaint  Annual Exam (Other/Pt is currently not taking any medication- she did not feel she needed it any longer )    Subjective        Nelia Levin presents to [facility]    HPI    Patient here for the above problems.  See Assessment and Plan for further HPI components.      Review of Systems    Objective   Vital Signs:  BP 98/56 (BP Location: Left arm, Patient Position: Sitting, Cuff Size: Adult)   Pulse 104   Temp 98.4 °F (36.9 °C) (Temporal)   Ht 162.6 cm (64.02\")   Wt 51.7 kg (114 lb)   SpO2 98%   BMI 19.56 kg/m²   Estimated body mass index is 19.56 kg/m² as calculated from the following:    Height as of this encounter: 162.6 cm (64.02\").    Weight as of this encounter: 51.7 kg (114 lb).      Physical Exam  Vitals and nursing note reviewed.   Constitutional:       Appearance: She is not ill-appearing.   Eyes:      General: No scleral icterus.     Conjunctiva/sclera: Conjunctivae normal.   Cardiovascular:      Rate and Rhythm: Normal rate and regular rhythm.   Pulmonary:      Effort: Pulmonary effort is normal. No respiratory distress.   Skin:     General: Skin is warm.      Coloration: Skin is not pale.   Neurological:      General: No focal deficit present.      Mental Status: She is alert and oriented to person, place, and time.   Psychiatric:         Mood and Affect: Mood normal.         Behavior: Behavior normal.                     Assessment and Plan   Diagnoses and all orders for this visit:    1. Wellness examination (Primary)    2. Screening mammogram, encounter for  -     Mammo Screening Digital Tomosynthesis Bilateral With CAD; Future    3. Anemia, unspecified type  -     CBC w AUTO Differential; Future  -     Iron Profile; Future  -     Ferritin; Future    4. Spondylosis of lumbar region without myelopathy or radiculopathy    5. Strain of lumbar region, subsequent encounter    6. Anxiety and depression      Recommend at least annual dental and " vision screening.  Recommend annual influenza vaccination  Recommend a varied diet and appropriate portion sizes.   CDC recommendations for physical activity:  At least 150 minutes a week (for example, 30 minutes a day, 5 days a week) of moderate-intensity activity such as brisk walking. Or can consider 75 minutes a week of vigorous-intensity activity such as hiking, jogging, or running.  At least 2 days a week of activities that strengthen muscles.  Plus activities to improve balance.    Health Maintenance Due   Topic Date Due    TDAP/TD VACCINES (1 - Tdap) Never done    PAP SMEAR  Never done    MAMMOGRAM  Never done    COVID-19 Vaccine (1 - 2024-25 season) Never done       History of Present Illness  The patient is a 44-year-old female who presents today for an annual visit.    She has discontinued her medication regimen and reports feeling well overall. She has not undergone a mammogram previously. She acknowledges that her dietary habits are not optimal and does not maintain a healthy diet or hydration status. She does not use tobacco.    She experiences heavy menstrual bleeding on the first day of her cycle. She recalls a history of anemia during her initial pregnancy in 2004. She is currently supplementing with iron.    Her back pain is currently under control.    Supplemental Information  She is up to date on her vision screening but not dental screening.    SOCIAL HISTORY  She does not smoke.    FAMILY HISTORY  There is no family history of colon cancer.    MEDICATIONS  Current: Iron      Assessment & Plan  1. Annual examination.  Her weight is within the normal range, and her blood pressure, although slightly lower than average, is consistent with her previous readings. The patient's LDL cholesterol level is commendably below 100, while her HDL cholesterol level is at a satisfactory 60. Her thyroid function is also within normal limits. She is advised to maintain adequate hydration. A hepatitis C screening  will be conducted as part of her routine health check.    2. Suspected Iron deficiency anemia.  Her hemoglobin levels have shown a slight decrease, which is not uncommon in premenopausal women due to monthly blood loss. This condition can be managed by either supplementing with over-the-counter iron or incorporating more iron-fortified foods into her diet. She is advised to take over-the-counter ferrous sulfate or ferrous gluconate for a duration of one month. A follow-up lab test will be scheduled in 3 months to monitor her hemoglobin and iron levels.    3. Back pain.  Her back pain is currently under control.  No longer taking meloxicam or flexeril.    4.  Anxiety and depression  Patient stopped effexor XR.  Symptoms presently controlled.    Follow-up  The patient will follow up in 1 year.    Mammogram ordered          Result Review :  The following data was reviewed by: Maximiliano Carmona MD on 02/03/2025:  CMP          1/24/2025    12:24   CMP   Glucose 80    BUN 11    Creatinine 0.70    Sodium 136    Potassium 3.9    Chloride 99    Calcium 9.8    Total Protein 7.7    Albumin 4.3    Globulin 3.4    Total Bilirubin 0.8    Alkaline Phosphatase 69    AST (SGOT) 8    ALT (SGPT) 8    BUN/Creatinine Ratio 15.7      CBC w/diff          1/24/2025    12:24   CBC w/Diff   WBC 6.68    RBC 3.89    Hemoglobin 11.4    Hematocrit 35.1    MCV 90.2    MCH 29.3    MCHC 32.5    RDW 11.8    Platelets 247    Neutrophil Rel % 60.4    Lymphocyte Rel % 30.7    Monocyte Rel % 8.1    Eosinophil Rel % 0.3    Basophil Rel % 0.4      Lipid Panel          1/24/2025    12:24   Lipid Panel   Total Cholesterol 161    Triglycerides 51    HDL Cholesterol 60    VLDL Cholesterol 11    LDL Cholesterol  90      TSH          1/24/2025    12:24   TSH   TSH 1.380          UA          5/23/2024    16:10 1/24/2025    12:24   Urinalysis   Squamous Epithelial Cells, UA 3-6     Specific Gravity, UA 1.017     Ketones, UA Negative     Blood, UA Negative   Negative    Leukocytes, UA Trace  Trace    Nitrite, UA Negative  Negative    RBC, UA 0-2  0-2    WBC, UA 3-5     Bacteria, UA 1+  1+           BMI is within normal parameters. No other follow-up for BMI required.      BMI is within normal parameters. No other follow-up for BMI required.            Follow Up   Return in about 1 year (around 2/3/2026), or if symptoms worsen or fail to improve.  Patient was given instructions and counseling regarding her condition or for health maintenance advice. Please see specific information pulled into the AVS if appropriate.       MENDY Carmona MD, FACP, [facility]      Electronically signed by Maximiliano Carmona MD, 02/03/25, 2:35 PM CST.    Patient or patient representative verbalized consent for the use of Ambient Listening during the visit with  Maximiliano Carmona MD for chart documentation. 2/3/2025  14:42 CST

## 2025-02-07 LAB
NCCN CRITERIA FLAG: NORMAL
TYRER CUZICK SCORE: 7.3

## 2025-02-26 ENCOUNTER — HOSPITAL ENCOUNTER (OUTPATIENT)
Dept: MAMMOGRAPHY | Facility: HOSPITAL | Age: 45
Discharge: HOME | End: 2025-02-26
Admitting: INTERNAL MEDICINE
Payer: COMMERCIAL

## 2025-02-26 DIAGNOSIS — Z12.31 SCREENING MAMMOGRAM, ENCOUNTER FOR: ICD-10-CM

## 2025-02-26 PROCEDURE — 77067 SCR MAMMO BI INCL CAD: CPT

## 2025-02-26 PROCEDURE — 77063 BREAST TOMOSYNTHESIS BI: CPT

## 2025-04-24 ENCOUNTER — OFFICE VISIT (OUTPATIENT)
Dept: OBGYN CLINIC | Age: 45
End: 2025-04-24

## 2025-04-24 VITALS
SYSTOLIC BLOOD PRESSURE: 126 MMHG | BODY MASS INDEX: 19.46 KG/M2 | HEIGHT: 64 IN | WEIGHT: 114 LBS | DIASTOLIC BLOOD PRESSURE: 70 MMHG | HEART RATE: 84 BPM

## 2025-04-24 DIAGNOSIS — Z12.4 CERVICAL CANCER SCREENING: ICD-10-CM

## 2025-04-24 DIAGNOSIS — Z01.419 WELL WOMAN EXAM WITH ROUTINE GYNECOLOGICAL EXAM: Primary | ICD-10-CM

## 2025-04-24 DIAGNOSIS — N85.2 UTERINE ENLARGEMENT: ICD-10-CM

## 2025-04-24 DIAGNOSIS — Z11.51 ENCOUNTER FOR SCREENING FOR HUMAN PAPILLOMAVIRUS (HPV): ICD-10-CM

## 2025-04-24 DIAGNOSIS — Z12.31 SCREENING MAMMOGRAM FOR BREAST CANCER: ICD-10-CM

## 2025-04-24 NOTE — PROGRESS NOTES
Pt presents today for pap smear and breast exam.      Last mammogram:  25  Last pap smear:  14  Contraception:  no   :  2  Para:  2  AB:  0  Last bone density:  n/a  Last colonoscopy: n/a         Dorothy Kendrick (:  1980) is a 44 y.o. female, Established patient, here for evaluation of the following chief complaint(s):  Annual Exam and New Patient        Subjective   History of Present Illness  The patient presents for a well-woman exam.    The last Pap smear was performed 11 years ago, with no history of abnormal results. Obstetric history includes 2 vaginal deliveries. Regular menstrual cycles occur monthly, lasting between 5 to 7 days. The first day of menstruation is typically heavy, requiring the use of both pads and tampons, which need frequent changing. A mammogram was completed in 2025.    External vaginal itching has been noted over the past week, more pronounced than usual, without any associated discharge or irregularities. The itching does not disrupt sleep. Cotton underwear is typically worn, and no recent changes to soap or detergent have been made.    Occasional episodes of a fishy odor are reported, which are not constant.    GYNECOLOGICAL HISTORY:  - Duration: 5-7 days  - Frequency: Monthly  - Flow: Heavy on the first day    OBSTETRICAL HISTORY:  2, Para 2    Review of Systems   Constitutional: Negative.    HENT: Negative.     Eyes: Negative.    Respiratory: Negative.     Cardiovascular: Negative.    Gastrointestinal: Negative.    Endocrine: Negative.    Genitourinary:  Positive for vaginal discharge.   Musculoskeletal: Negative.    Skin: Negative.    Allergic/Immunologic: Negative.    Neurological: Negative.    Hematological: Negative.    Psychiatric/Behavioral: Negative.            Objective   Blood pressure 126/70, pulse 84, height 1.626 m (5' 4\"), weight 51.7 kg (114 lb), last menstrual period 2025, unknown if currently breastfeeding.  Physical

## 2025-04-25 LAB — HPV16+18+H RISK 12 DNA SPEC-IMP: NORMAL

## 2025-04-28 ENCOUNTER — HOSPITAL ENCOUNTER (OUTPATIENT)
Dept: ULTRASOUND IMAGING | Age: 45
Discharge: HOME | End: 2025-04-28
Attending: OBSTETRICS & GYNECOLOGY
Payer: COMMERCIAL

## 2025-04-28 DIAGNOSIS — N85.2 UTERINE ENLARGEMENT: ICD-10-CM

## 2025-04-28 LAB
HPV HR 12 DNA SPEC QL NAA+PROBE: NOT DETECTED
HPV16 DNA SPEC QL NAA+PROBE: NOT DETECTED
HPV16+18+H RISK 12 DNA SPEC-IMP: NORMAL
HPV18 DNA SPEC QL NAA+PROBE: NOT DETECTED

## 2025-04-28 PROCEDURE — 76830 TRANSVAGINAL US NON-OB: CPT

## 2025-04-29 ENCOUNTER — RESULTS FOLLOW-UP (OUTPATIENT)
Dept: OBGYN CLINIC | Age: 45
End: 2025-04-29

## 2025-07-21 ENCOUNTER — TELEPHONE (OUTPATIENT)
Dept: INTERNAL MEDICINE | Facility: CLINIC | Age: 45
End: 2025-07-21

## 2025-07-30 ENCOUNTER — OFFICE VISIT (OUTPATIENT)
Dept: INTERNAL MEDICINE | Facility: CLINIC | Age: 45
End: 2025-07-30
Payer: COMMERCIAL

## 2025-07-30 VITALS
HEIGHT: 64 IN | SYSTOLIC BLOOD PRESSURE: 98 MMHG | DIASTOLIC BLOOD PRESSURE: 64 MMHG | WEIGHT: 115 LBS | BODY MASS INDEX: 19.63 KG/M2 | HEART RATE: 81 BPM | OXYGEN SATURATION: 97 % | TEMPERATURE: 97.4 F

## 2025-07-30 DIAGNOSIS — F32.A ANXIETY AND DEPRESSION: Primary | ICD-10-CM

## 2025-07-30 DIAGNOSIS — M47.816 SPONDYLOSIS OF LUMBAR REGION WITHOUT MYELOPATHY OR RADICULOPATHY: ICD-10-CM

## 2025-07-30 DIAGNOSIS — F41.9 ANXIETY AND DEPRESSION: Primary | ICD-10-CM

## 2025-07-30 RX ORDER — VENLAFAXINE HYDROCHLORIDE 37.5 MG/1
37.5 CAPSULE, EXTENDED RELEASE ORAL DAILY
Qty: 90 CAPSULE | Refills: 2 | Status: SHIPPED | OUTPATIENT
Start: 2025-07-30

## 2025-07-30 NOTE — PROGRESS NOTES
"      Chief Complaint  Depression (Discuss going back on medication /Effexor( most recent- worked well)/Zoloft- had been on at one time- )    Subjective        Nelia Levin presents to [facility]    HPI    Patient here for the above problems.  See Assessment and Plan for further HPI components.      Review of Systems   Neurological:  Positive for dizziness.   Psychiatric/Behavioral:  Negative for confusion.        Objective   Vital Signs:  BP 98/64 (BP Location: Left arm, Patient Position: Sitting, Cuff Size: Adult)   Pulse 81   Temp 97.4 °F (36.3 °C) (Temporal)   Ht 162.6 cm (64.02\")   Wt 52.2 kg (115 lb)   SpO2 97%   BMI 19.73 kg/m²   Estimated body mass index is 19.73 kg/m² as calculated from the following:    Height as of this encounter: 162.6 cm (64.02\").    Weight as of this encounter: 52.2 kg (115 lb).      Physical Exam  Vitals and nursing note reviewed.   Constitutional:       Appearance: She is not ill-appearing.   Eyes:      General: No scleral icterus.     Conjunctiva/sclera: Conjunctivae normal.   Pulmonary:      Effort: Pulmonary effort is normal. No respiratory distress.   Skin:     General: Skin is warm.      Coloration: Skin is not pale.   Neurological:      General: No focal deficit present.      Mental Status: She is alert and oriented to person, place, and time.   Psychiatric:         Mood and Affect: Mood normal.         Behavior: Behavior normal.                       Assessment and Plan   Diagnoses and all orders for this visit:    1. Anxiety and depression (Primary)  -     venlafaxine XR (Effexor XR) 37.5 MG 24 hr capsule; Take 1 capsule by mouth Daily.  Dispense: 90 capsule; Refill: 2    2. Spondylosis of lumbar region without myelopathy or radiculopathy        History of Present Illness  The patient is a 44-year-old female who presents today for follow-up.    She reports that Effexor has been more effective than Zoloft in managing her symptoms. She does " not endorse any current thoughts of self-harm.    She has experienced some back pain, but it is not as severe as before.      Assessment & Plan  1. Depression and anxiety  Reports that Effexor worked better than Zoloft in the past. She will be restarted on Effexor 37.5 mg, which was the initial dose that previously worked for her. Potential side effects, including weight gain, dizziness, and nausea, were discussed. A prescription for a 90-day supply with refills has been sent to the pharmacy. Follow-up will be conducted in 2 to 3 months to assess response and determine if a dosage adjustment is necessary.  PHQ9 - 12; somewhat difficult    2. Back pain.  Reports some back pain but notes it is not as severe as before.  Overall stable to improved.    Follow-up: The patient will follow up in January or February 2026.        Result Review :           BMI is within normal parameters. No other follow-up for BMI required.      BMI is within normal parameters. No other follow-up for BMI required.            Follow Up   Return in about 6 months (around 1/30/2026), or if symptoms worsen or fail to improve, for follow up for above problems. Longitudinal care., Annual physical - Labs prior to visit.  Patient was given instructions and counseling regarding her condition or for health maintenance advice. Please see specific information pulled into the AVS if appropriate.       MENDY Carmona MD, [facility], [facility]      Electronically signed by Maximiliano Carmona MD, 07/30/25, 4:56 PM CDT.    Patient or patient representative verbalized consent for the use of Ambient Listening during the visit with  Maximiliano Carmona MD for chart documentation. 7/30/2025  16:58 CDT      Answers submitted by the patient for this visit:  Depression (Submitted on 7/23/2025)  Chief Complaint: Depression  Visit: initial  Onset: at an unknown time  Progression since onset: comes and goes  Frequency: most days  Severity: mild  excessive worry: Yes  insomnia:  Yes  irritability: No  malaise/fatigue: Yes  obsessions: No  hypersomnia: No  difficulty controlling mood: No  difficulty staying asleep: Yes  difficulty falling asleep: Yes  Aggravated by: nothing